# Patient Record
Sex: MALE | Employment: OTHER | URBAN - METROPOLITAN AREA
[De-identification: names, ages, dates, MRNs, and addresses within clinical notes are randomized per-mention and may not be internally consistent; named-entity substitution may affect disease eponyms.]

---

## 2022-08-03 ENCOUNTER — TELEMEDICINE (OUTPATIENT)
Dept: NEUROLOGY | Age: 67
End: 2022-08-03
Payer: COMMERCIAL

## 2022-08-03 DIAGNOSIS — G95.9 CERVICAL MYELOPATHY (HCC): Primary | ICD-10-CM

## 2022-08-03 DIAGNOSIS — G24.3 CERVICAL DYSTONIA: ICD-10-CM

## 2022-08-03 DIAGNOSIS — G95.9 CERVICAL MYELOPATHY (HCC): ICD-10-CM

## 2022-08-03 PROCEDURE — 99214 OFFICE O/P EST MOD 30 MIN: CPT | Performed by: PSYCHIATRY & NEUROLOGY

## 2022-08-03 RX ORDER — OXYCODONE HYDROCHLORIDE 30 MG/1
30 TABLET ORAL EVERY 6 HOURS PRN
Qty: 120 TABLET | Refills: 0 | Status: SHIPPED | OUTPATIENT
Start: 2022-10-03 | End: 2022-11-02

## 2022-08-03 RX ORDER — OXYCODONE HYDROCHLORIDE 30 MG/1
30 TABLET ORAL EVERY 6 HOURS PRN
Qty: 120 TABLET | Refills: 0 | Status: SHIPPED | OUTPATIENT
Start: 2022-09-03 | End: 2022-10-03

## 2022-08-03 RX ORDER — OXYCODONE HYDROCHLORIDE 30 MG/1
30 TABLET ORAL EVERY 6 HOURS PRN
Qty: 124 TABLET | Refills: 0 | Status: SHIPPED | OUTPATIENT
Start: 2022-09-03 | End: 2022-08-03 | Stop reason: SDUPTHER

## 2022-08-03 RX ORDER — OXYCODONE HYDROCHLORIDE 30 MG/1
30 TABLET ORAL EVERY 6 HOURS PRN
Qty: 124 TABLET | Refills: 0 | Status: SHIPPED | OUTPATIENT
Start: 2022-08-03 | End: 2022-09-02

## 2022-08-03 ASSESSMENT — ENCOUNTER SYMPTOMS
GASTROINTESTINAL NEGATIVE: 1
RESPIRATORY NEGATIVE: 1
ALLERGIC/IMMUNOLOGIC NEGATIVE: 1
EYES NEGATIVE: 1

## 2022-08-03 NOTE — TELEPHONE ENCOUNTER
Patient called stating that his prescription for the Oxycodone 30 mg was prescribed starting in September rather than starting in August like it was meant to, states that he is pretty much out of medication and is needing the prescription re sent in. Pls adv.      Last appt: VV 8/3  Next appt: n/a

## 2022-08-03 NOTE — PROGRESS NOTES
[address], [address], [address]  Phone: [phone] Fax [phone]  Dr. Dewayne Preciado      8/3/2022  Alia Jeans     Patient is referred by the following provider for consultation regarding as below:       I reviewed the available records and notes and have examined patient with the following findings:     Chief Complaint:  No chief complaint on file. HPI: This is a right handed 61 y.o.  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 I have him on Oxy IR 30 mg he takes 4 tablets a day gabapentin 600 mg twice a day baclofen 10 mg 1-2 3 times a day. And he was taking Klonopin but we are able to wean him off the Klonopin. He has been doing well overall he has no complaints he is already gone through all conservative care chronic pain management epidural steroid injections he has no interest in doing a spinal cord stimulator to his cervical spine nor would I allow him. 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 He has no other complaints at this time he is stable comfortable. His  aware program was looked into it was cleared. IMAGING REVIEW:  I REVIEWED PERTINENT  IMAGES AND REPORTS WITH THE PATIENT PERSONALLY, DIRECTLY AND FULLY. Past Medical History:  No past medical history on file. Past Surgical History:  No past surgical history on file.     Social History:  Social History     Socioeconomic History    Marital status:      Spouse name: Not on file    Number of children: Not on file    Years of education: Not on file    Highest education level: Not on file   Occupational History    Not on file   Tobacco Use    Smoking status: Former    Smokeless tobacco: Never   Substance and Sexual Activity    Alcohol use: Never    Drug use: Never    Sexual activity: Not on file   Other Topics Concern    Not on file   Social History Narrative    Not on file     Social Determinants of Health     Financial Resource Strain: Not on file   Food Insecurity: Not on file   Transportation Needs: Not on file   Physical Activity: Not on file   Stress: Not on file   Social Connections: Not on file   Intimate Partner Violence: Not on file   Housing Stability: Not on file       Family History:   No family history on file. Current Outpatient Medications on File Prior to Visit   Medication Sig Dispense Refill    baclofen (LIORESAL) 10 MG tablet Take 1-2 po tid      clonazePAM (KLONOPIN) 0.5 MG tablet Take 0.5 mg by mouth 2 times daily. diazePAM (VALIUM) 10 MG tablet Take 10 mg by mouth. diazePAM (VALIUM) 5 MG tablet Take 5 mg by mouth.      gabapentin (NEURONTIN) 600 MG tablet Take 600 mg by mouth 2 times daily. naloxone 4 MG/0.1ML LIQD nasal spray Use 1 spray intranasally, then discard. Repeat with new spray every 2 min as needed for opioid overdose symptoms, alternating nostrils. No current facility-administered medications on file prior to visit. Not on File    Review of Systems:  Review of Systems   Constitutional: Negative. HENT: Negative. Eyes: Negative. Respiratory: Negative. Cardiovascular: Negative. Gastrointestinal: Negative. Endocrine: Negative. Musculoskeletal:  Positive for neck pain. Allergic/Immunologic: Negative. No flowsheet data found. No flowsheet data found. There were no vitals filed for this visit. Physical Exam  Neurological:      Mental Status: He is alert and oriented to person, place, and time. Neurologic Exam     Mental Status   Oriented to person, place, and time. Attention: normal. Concentration: normal.   Level of consciousness: alert  Knowledge: good. Speech is clear and concise         Assessment   Assessment / Plan:    Diagnoses and all orders for this visit:    Cervical myelopathy ([facility] [address] the patient does suffer from post cervical myelopathy pain. From many years ago 20+ years ago. He has remained strong and doing well as long as he is on his medications Oxy IR 30 mg 4 times a day gabapentin 600 mg twice a day baclofen 10 mg 1-2 3 times a day Klonopin was discontinued he did not. He is doing well he has no complaints his  aware program was looked into it was cleared. -     oxyCODONE (OXY-IR) 30 MG immediate release tablet; Take 1 tablet by mouth every 6 hours as needed for Pain for up to 30 days. -     oxyCODONE (OXY-IR) 30 MG immediate release tablet; Take 1 tablet by mouth every 6 hours as needed for Pain for up to 30 days. -     oxyCODONE (OXY-IR) 30 MG immediate release tablet; Take 1 tablet by mouth every 6 hours as needed for Pain for up to 30 days. Cervical dystonia this was as residual from the trauma to his cervical spine from the myelopathy and the surgical procedures. Unfortunately Botox made it much worse. The Diagnosis and differential diagnostic considerations, and Rx Tx were reviewed with the patient at length. No orders of the defined types were placed in this encounter. I have spent greater than 50% of visit discussing and counseling of patient  for treatment and diagnostic plan review. Total time30 min     . Notes: Patient is to continue all medications as directed by prescribing physicians. Continuations on today's visit are made based on the patient's report of current medications.              Dr. Nicol Campos  Consultation Neurology, Neurodiagnostics and Neurotherapeutics  Neuroelectrophysiology, EEG, EMG  [facility] [place]  [address] [place] [address], [address] [facility]  Phone:  [phone]  Fax:   [phone]

## 2022-09-06 RX ORDER — BACLOFEN 10 MG/1
TABLET ORAL
Qty: 180 TABLET | Refills: 5 | Status: SHIPPED | OUTPATIENT
Start: 2022-09-06

## 2022-11-28 ENCOUNTER — TELEPHONE (OUTPATIENT)
Dept: NEUROLOGY | Age: 67
End: 2022-11-28

## 2022-11-28 DIAGNOSIS — M54.12 CERVICAL RADICULITIS: Primary | ICD-10-CM

## 2022-11-28 DIAGNOSIS — M54.12 RADICULOPATHY, CERVICAL REGION: Primary | ICD-10-CM

## 2022-11-28 RX ORDER — OXYCODONE HYDROCHLORIDE 30 MG/1
30 TABLET ORAL EVERY 6 HOURS PRN
Qty: 120 TABLET | Refills: 0 | Status: CANCELLED | OUTPATIENT
Start: 2022-11-28 | End: 2022-12-28

## 2022-11-28 RX ORDER — OXYCODONE HYDROCHLORIDE 30 MG/1
30 TABLET ORAL EVERY 6 HOURS PRN
Qty: 120 TABLET | Refills: 0 | Status: SHIPPED | OUTPATIENT
Start: 2022-11-28 | End: 2022-12-28

## 2022-11-28 NOTE — TELEPHONE ENCOUNTER
Patient called requesting refill on oxycodone 30 mg sent to:    [facility], [address] [place] [address]   Phone:  [phone]  Fax:  [phone]        Last seen: 08/03/22  Next appt: 12/06/22

## 2022-12-06 ENCOUNTER — TELEMEDICINE (OUTPATIENT)
Dept: NEUROLOGY | Age: 67
End: 2022-12-06
Payer: COMMERCIAL

## 2022-12-06 DIAGNOSIS — M54.12 CERVICAL RADICULITIS: ICD-10-CM

## 2022-12-06 DIAGNOSIS — G24.3 CERVICAL DYSTONIA: Primary | ICD-10-CM

## 2022-12-06 PROCEDURE — G8427 DOCREV CUR MEDS BY ELIG CLIN: HCPCS | Performed by: PSYCHIATRY & NEUROLOGY

## 2022-12-06 PROCEDURE — 99213 OFFICE O/P EST LOW 20 MIN: CPT | Performed by: PSYCHIATRY & NEUROLOGY

## 2022-12-06 PROCEDURE — 1123F ACP DISCUSS/DSCN MKR DOCD: CPT | Performed by: PSYCHIATRY & NEUROLOGY

## 2022-12-06 PROCEDURE — G8484 FLU IMMUNIZE NO ADMIN: HCPCS | Performed by: PSYCHIATRY & NEUROLOGY

## 2022-12-06 PROCEDURE — 4004F PT TOBACCO SCREEN RCVD TLK: CPT | Performed by: PSYCHIATRY & NEUROLOGY

## 2022-12-06 PROCEDURE — G8421 BMI NOT CALCULATED: HCPCS | Performed by: PSYCHIATRY & NEUROLOGY

## 2022-12-06 PROCEDURE — 3017F COLORECTAL CA SCREEN DOC REV: CPT | Performed by: PSYCHIATRY & NEUROLOGY

## 2022-12-06 RX ORDER — GABAPENTIN 600 MG/1
600 TABLET ORAL 2 TIMES DAILY
Qty: 540 TABLET | Refills: 3 | Status: SHIPPED | OUTPATIENT
Start: 2022-12-06 | End: 2023-03-06

## 2022-12-06 RX ORDER — OXYCODONE HYDROCHLORIDE 30 MG/1
30 TABLET ORAL EVERY 6 HOURS PRN
Qty: 120 TABLET | Refills: 0 | Status: SHIPPED | OUTPATIENT
Start: 2023-01-30 | End: 2023-03-01

## 2022-12-06 RX ORDER — OXYCODONE HYDROCHLORIDE 30 MG/1
30 TABLET ORAL EVERY 6 HOURS PRN
Qty: 120 TABLET | Refills: 0 | Status: SHIPPED | OUTPATIENT
Start: 2022-12-30 | End: 2023-01-29

## 2022-12-06 ASSESSMENT — ENCOUNTER SYMPTOMS
ALLERGIC/IMMUNOLOGIC NEGATIVE: 1
RESPIRATORY NEGATIVE: 1
GASTROINTESTINAL NEGATIVE: 1
EYES NEGATIVE: 1

## 2022-12-06 NOTE — PROGRESS NOTES
[address], [address]  [place], [address]  Phone: [phone] Fax [phone]  Dr. Carmella Hopper was in the office While conducting this encounter. She and/ or her healthcare decision maker is aware that this patient-initialed Telehealth encounter is a billable service with coverage as determined by her insurance carrier. She is aware that she may receive a bill and has provided verbal consent to proceed: Yes    The Virtual visit was conducted telephone encounter only. -  I affirm this is a Patient Initiated Episode with an Established Patient who has not had a related appointment within my department in the past 7 days or scheduled within the next 24 hours. Note: this encounter is not billable if this call serves to triage the patient into an appointment for the relevant concern. Total Time:  minutes: 20-29 minutes          12/6/2022  Delora Mail     Patient is referred by the following provider for consultation regarding as below:       I reviewed the available records and notes and have examined patient with the following findings:     Chief Complaint:  No chief complaint on file. HPI: This is a right handed 74 y.o.  male who is very pleasant very appropriate gentleman who I have been taking care for many years 20+ years. 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 He actually did go through Botox and unfortunately Botox increased his pain level from severe to enormously severe.   Over the years of injections and physical therapy trigger point injections manipulation he is resolved with Oxy IR 30 mg 4-day gabapentin 600 mg twice a day and baclofen 10 mg 2 tablets 3 times a day. He weaned himself off therapy Klonopin due to the stigma of taking it. And is still doing well. He is of course gets more more spasms. But he is stable comfortable his  aware program was looked into and was cleared he filled his medicines on 12 1 a 22 syringes and tomorrow's prescriptions in. That is for his pain medicines. And refills gabapentin. He is pleasant cooperative and doing well. IMAGING REVIEW:  I REVIEWED PERTINENT  IMAGES AND REPORTS WITH THE PATIENT PERSONALLY, DIRECTLY AND FULLY. Past Medical History:  No past medical history on file. Past Surgical History:  No past surgical history on file. Social History:  Social History     Socioeconomic History    Marital status:      Spouse name: Not on file    Number of children: Not on file    Years of education: Not on file    Highest education level: Not on file   Occupational History    Not on file   Tobacco Use    Smoking status: Former    Smokeless tobacco: Never   Substance and Sexual Activity    Alcohol use: Never    Drug use: Never    Sexual activity: Not on file   Other Topics Concern    Not on file   Social History Narrative    Not on file     Social Determinants of Health     Financial Resource Strain: Not on file   Food Insecurity: Not on file   Transportation Needs: Not on file   Physical Activity: Not on file   Stress: Not on file   Social Connections: Not on file   Intimate Partner Violence: Not on file   Housing Stability: Not on file       Family History:   No family history on file. Current Outpatient Medications on File Prior to Visit   Medication Sig Dispense Refill    oxyCODONE (OXY-IR) 30 MG immediate release tablet Take 1 tablet by mouth every 6 hours as needed for Pain for up to 30 days. 120 tablet 0    baclofen (LIORESAL) 10 MG tablet T1KE 1-2 TABLETS BY MOUTH THREE TIMES DAILY 180 tablet 5    clonazePAM (KLONOPIN) 0.5 MG tablet Take 0.5 mg by mouth 2 times daily.       diazePAM (VALIUM) 10 MG tablet Take 10 mg by mouth. diazePAM (VALIUM) 5 MG tablet Take 5 mg by mouth.      gabapentin (NEURONTIN) 600 MG tablet Take 600 mg by mouth 2 times daily. naloxone 4 MG/0.1ML LIQD nasal spray Use 1 spray intranasally, then discard. Repeat with new spray every 2 min as needed for opioid overdose symptoms, alternating nostrils. No current facility-administered medications on file prior to visit. Not on File    Review of Systems:  Review of Systems   Constitutional: Negative. HENT: Negative. Eyes: Negative. Respiratory: Negative. Cardiovascular: Negative. Gastrointestinal: Negative. Endocrine: Negative. Genitourinary: Negative. Musculoskeletal:  Positive for neck pain. Allergic/Immunologic: Negative. Neurological: Negative. Hematological: Negative. Psychiatric/Behavioral: Negative. No flowsheet data found. No flowsheet data found. There were no vitals filed for this visit. Physical Exam  Neurological:      Mental Status: He is alert and oriented to person, place, and time. Neurologic Exam     Mental Status   Oriented to person, place, and time. Attention: normal. Concentration: normal.   Level of consciousness: alert  Knowledge: good. His speech is clear and concise. Assessment   Assessment / Plan:    Diagnoses and all orders for this visit:    Cervical dystonia    Cervical radiculitis        The Diagnosis and differential diagnostic considerations, and Rx Tx were reviewed with the patient at length. No orders of the defined types were placed in this encounter. I have spent greater than 50% of visit discussing and counseling of patient  for treatment and diagnostic plan review. Total time30 min     . Notes: Patient is to continue all medications as directed by prescribing physicians. Continuations on today's visit are made based on the patient's report of current medications.              Dr. Tiffanie Mcadams [facility]  Consultation Neurology, Neurodiagnostics and Neurotherapeutics  Neuroelectrophysiology, EEG, EMG  [facility]  [facility] [place] [place] [address], [address] [facility]  Phone:  [phone]  Fax:   [phone]

## 2022-12-13 ENCOUNTER — TELEPHONE (OUTPATIENT)
Dept: NEUROLOGY | Age: 67
End: 2022-12-13

## 2022-12-13 NOTE — TELEPHONE ENCOUNTER
----- Message from Brian Shafer DO sent at 12/6/2022  6:52 AM EST -----  Regarding: fu  Fu virtual 7AM in 3-4 mths

## 2023-02-24 ENCOUNTER — TELEMEDICINE (OUTPATIENT)
Dept: NEUROLOGY | Age: 68
End: 2023-02-24
Payer: COMMERCIAL

## 2023-02-24 DIAGNOSIS — M54.12 CERVICAL RADICULITIS: ICD-10-CM

## 2023-02-24 DIAGNOSIS — G24.3 CERVICAL DYSTONIA: ICD-10-CM

## 2023-02-24 PROCEDURE — 1123F ACP DISCUSS/DSCN MKR DOCD: CPT | Performed by: PSYCHIATRY & NEUROLOGY

## 2023-02-24 PROCEDURE — 99213 OFFICE O/P EST LOW 20 MIN: CPT | Performed by: PSYCHIATRY & NEUROLOGY

## 2023-02-24 PROCEDURE — G8484 FLU IMMUNIZE NO ADMIN: HCPCS | Performed by: PSYCHIATRY & NEUROLOGY

## 2023-02-24 PROCEDURE — 4004F PT TOBACCO SCREEN RCVD TLK: CPT | Performed by: PSYCHIATRY & NEUROLOGY

## 2023-02-24 PROCEDURE — 3017F COLORECTAL CA SCREEN DOC REV: CPT | Performed by: PSYCHIATRY & NEUROLOGY

## 2023-02-24 PROCEDURE — G8421 BMI NOT CALCULATED: HCPCS | Performed by: PSYCHIATRY & NEUROLOGY

## 2023-02-24 PROCEDURE — G8427 DOCREV CUR MEDS BY ELIG CLIN: HCPCS | Performed by: PSYCHIATRY & NEUROLOGY

## 2023-02-24 RX ORDER — OXYCODONE HYDROCHLORIDE 30 MG/1
30 TABLET ORAL EVERY 8 HOURS PRN
Qty: 90 TABLET | Refills: 0 | Status: SHIPPED | OUTPATIENT
Start: 2023-02-28 | End: 2023-03-30

## 2023-02-24 RX ORDER — OXYCODONE HYDROCHLORIDE 30 MG/1
30 TABLET ORAL EVERY 8 HOURS PRN
Qty: 90 TABLET | Refills: 0 | Status: SHIPPED | OUTPATIENT
Start: 2023-04-29 | End: 2023-05-29

## 2023-02-24 RX ORDER — OXYCODONE HYDROCHLORIDE 30 MG/1
30 TABLET ORAL EVERY 8 HOURS PRN
Qty: 90 TABLET | Refills: 0 | Status: SHIPPED | OUTPATIENT
Start: 2023-03-30 | End: 2023-04-29

## 2023-02-24 RX ORDER — BACLOFEN 10 MG/1
TABLET ORAL
Qty: 180 TABLET | Refills: 5 | Status: SHIPPED | OUTPATIENT
Start: 2023-02-24

## 2023-02-24 RX ORDER — GABAPENTIN 600 MG/1
600 TABLET ORAL 2 TIMES DAILY
Qty: 540 TABLET | Refills: 3 | Status: SHIPPED | OUTPATIENT
Start: 2023-02-24 | End: 2023-05-25

## 2023-02-24 ASSESSMENT — ENCOUNTER SYMPTOMS
EYES NEGATIVE: 1
RESPIRATORY NEGATIVE: 1
GASTROINTESTINAL NEGATIVE: 1
ALLERGIC/IMMUNOLOGIC NEGATIVE: 1

## 2023-02-24 NOTE — PROGRESS NOTES
[address], [address] [place] [address], [address] [place] [address]  Phone: [phone] Fax [phone]  Dr. Veronica Frank      I was in the office While conducting this encounter. She and/ or her healthcare decision maker is aware that this patient-initialed Telehealth encounter is a billable service with coverage as determined by her insurance carrier. She is aware that she may receive a bill and has provided verbal consent to proceed: Yes    The Virtual visit was conducted telephone encounter only. -  I affirm this is a Patient Initiated Episode with an Established Patient who has not had a related appointment within my department in the past 7 days or scheduled within the next 24 hours. Note: this encounter is not billable if this call serves to triage the patient into an appointment for the relevant concern. Total Time:  minutes: 21-30 minutes      2/24/2023  Duy Palmer     Patient is referred by the following provider for consultation regarding as below:       I reviewed the available records and notes and have examined patient with the following findings:     Chief Complaint:  No chief complaint on file. HPI: This is a right handed 74 y.o.  male who is very pleasant very appropriate patient who unfortunately does have history that I been taking care of him for 20+ years. He unfortunately had status post severe cervical spinal cord stenosis with myelopathy that required surgical intervention he. 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 And never did help. We have got he was on very high dose of medications of course this was in the early 2000's when that was mandatory. We have got him down to Oxy IR 30 mg tablets 3 times a day (he was on 4 times a day).   We have him on gabapentin 600 mg twice a day baclofen 10 mg 2 tablets 3 times a day and we have weaned him off of his Klonopin. He is doing well he is stable he is comfortable at this time. As long as he gets his medications he is stable. He has no complaints. We have also tried a multitude of conservative procedures which include physical therapy injections epidural steroid injections trigger point injections chronic pain management. IMAGING REVIEW:  I REVIEWED PERTINENT  IMAGES AND REPORTS WITH THE PATIENT PERSONALLY, DIRECTLY AND FULLY. Past Medical History:  No past medical history on file. Past Surgical History:  No past surgical history on file. Social History:  Social History     Socioeconomic History    Marital status:      Spouse name: Not on file    Number of children: Not on file    Years of education: Not on file    Highest education level: Not on file   Occupational History    Not on file   Tobacco Use    Smoking status: Former    Smokeless tobacco: Never   Substance and Sexual Activity    Alcohol use: Never    Drug use: Never    Sexual activity: Not on file   Other Topics Concern    Not on file   Social History Narrative    Not on file     Social Determinants of Health     Financial Resource Strain: Not on file   Food Insecurity: Not on file   Transportation Needs: Not on file   Physical Activity: Not on file   Stress: Not on file   Social Connections: Not on file   Intimate Partner Violence: Not on file   Housing Stability: Not on file       Family History:   No family history on file. Current Outpatient Medications on File Prior to Visit   Medication Sig Dispense Refill    clonazePAM (KLONOPIN) 0.5 MG tablet Take 0.5 mg by mouth 2 times daily. diazePAM (VALIUM) 10 MG tablet Take 10 mg by mouth. diazePAM (VALIUM) 5 MG tablet Take 5 mg by mouth.      naloxone 4 MG/0.1ML LIQD nasal spray Use 1 spray intranasally, then discard. Repeat with new spray every 2 min as needed for opioid overdose symptoms, alternating nostrils.        No current facility-administered medications on file prior to visit. Not on File    Review of Systems:  Review of Systems   Constitutional: Negative. HENT: Negative. Eyes: Negative. Respiratory: Negative. Cardiovascular: Negative. Gastrointestinal: Negative. Endocrine: Negative. Genitourinary: Negative. Musculoskeletal:  Positive for neck pain. Allergic/Immunologic: Negative. Hematological: Negative. Psychiatric/Behavioral: Negative. No flowsheet data found. No flowsheet data found. There were no vitals filed for this visit. Physical Exam  Neurological:      Mental Status: He is alert and oriented to person, place, and time. Neurologic Exam     Mental Status   Oriented to person, place, and time. Attention: normal. Concentration: normal.   Level of consciousness: alert  Knowledge: good. His speech is clear and concise         Assessment   Assessment / Plan:    Diagnoses and all orders for this visit:    Cervical radiculitis we now have him down to Yoselin 30 tid. He was taking 4 a day. He is also on gabapentin 600 mg twice a day baclofen 10 mg 2 tablets 3 times a day and we have also got him off of his Klonopin. His  aware program was looked into and is cleared. -     oxyCODONE (ROXICODONE) 30 MG immediate release tablet; Take 1 tablet by mouth every 8 hours as needed for Pain for up to 30 days. Max Daily Amount: 90 mg  -     oxyCODONE (ROXICODONE) 30 MG immediate release tablet; Take 1 tablet by mouth every 8 hours as needed for Pain for up to 30 days. Max Daily Amount: 90 mg  -     oxyCODONE (ROXICODONE) 30 MG immediate release tablet; Take 1 tablet by mouth every 8 hours as needed for Pain for up to 30 days. Max Daily Amount: 90 mg    Cervical dystonia  -     oxyCODONE (ROXICODONE) 30 MG immediate release tablet; Take 1 tablet by mouth every 8 hours as needed for Pain for up to 30 days.  Max Daily Amount: 90 mg    Other orders  -     baclofen (LIORESAL) 10 MG tablet; T1KE 1-2 TABLETS BY MOUTH THREE TIMES DAILY  -     gabapentin (NEURONTIN) 600 MG tablet; Take 1 tablet by mouth 2 times daily for 90 days. The Diagnosis and differential diagnostic considerations, and Rx Tx were reviewed with the patient at length. No orders of the defined types were placed in this encounter. I have spent greater than 50% of visit discussing and counseling of patient  for treatment and diagnostic plan review. Total time20     . Notes: Patient is to continue all medications as directed by prescribing physicians. Continuations on today's visit are made based on the patient's report of current medications.              Dr. Veronica Frank  Consultation Neurology, Neurodiagnostics and Neurotherapeutics  Neuroelectrophysiology, EEG, EMG  [facility]  [address] [place] [address], [address] [facility]  Phone:  [phone]  Fax:   [phone]

## 2023-03-17 ENCOUNTER — TELEPHONE (OUTPATIENT)
Dept: NEUROLOGY | Age: 68
End: 2023-03-17

## 2023-03-17 NOTE — TELEPHONE ENCOUNTER
----- Message from Deandra Solis DO sent at 2/24/2023  6:52 AM EST -----  Regarding: fu  Will need a follow-up appointment in about 6 months please

## 2023-05-22 ENCOUNTER — TELEPHONE (OUTPATIENT)
Dept: NEUROLOGY | Age: 68
End: 2023-05-22

## 2023-05-22 DIAGNOSIS — M54.12 CERVICAL RADICULITIS: ICD-10-CM

## 2023-05-22 RX ORDER — OXYCODONE HYDROCHLORIDE 30 MG/1
30 TABLET ORAL EVERY 8 HOURS PRN
Qty: 90 TABLET | Refills: 0 | Status: SHIPPED | OUTPATIENT
Start: 2023-05-22 | End: 2023-06-21

## 2023-06-20 ENCOUNTER — TELEPHONE (OUTPATIENT)
Dept: NEUROLOGY | Age: 68
End: 2023-06-20

## 2023-06-21 DIAGNOSIS — M54.12 CERVICAL RADICULITIS: ICD-10-CM

## 2023-06-21 RX ORDER — OXYCODONE HYDROCHLORIDE 30 MG/1
30 TABLET ORAL EVERY 8 HOURS PRN
Qty: 90 TABLET | Refills: 0 | Status: SHIPPED | OUTPATIENT
Start: 2023-06-21 | End: 2023-07-21

## 2023-07-24 ENCOUNTER — TELEPHONE (OUTPATIENT)
Dept: NEUROLOGY | Age: 68
End: 2023-07-24

## 2023-07-24 DIAGNOSIS — M54.12 CERVICAL RADICULITIS: Primary | ICD-10-CM

## 2023-07-24 RX ORDER — OXYCODONE HYDROCHLORIDE 30 MG/1
30 TABLET ORAL EVERY 8 HOURS PRN
Qty: 90 TABLET | Refills: 0 | Status: SHIPPED | OUTPATIENT
Start: 2023-07-24 | End: 2023-08-23

## 2023-07-24 RX ORDER — BACLOFEN 10 MG/1
TABLET ORAL
Qty: 180 TABLET | Refills: 5 | Status: SHIPPED | OUTPATIENT
Start: 2023-07-24

## 2023-07-24 NOTE — TELEPHONE ENCOUNTER
Patient called back stating he also needs a refill of the Baclofen 10 mg as well as the Oxycodone 30 mg sent to [facility] pharmacy. Patient is scheduled for a follow up August 16th.

## 2023-07-24 NOTE — TELEPHONE ENCOUNTER
Patient needs a refill of Oxycodone 30 MG sent to [facility].  Patients next follow up is August 16 th.

## 2023-08-16 ENCOUNTER — TELEMEDICINE (OUTPATIENT)
Dept: NEUROLOGY | Age: 68
End: 2023-08-16
Payer: COMMERCIAL

## 2023-08-16 DIAGNOSIS — M54.12 CERVICAL RADICULITIS: ICD-10-CM

## 2023-08-16 DIAGNOSIS — G24.3 CERVICAL DYSTONIA: Primary | ICD-10-CM

## 2023-08-16 PROCEDURE — 1123F ACP DISCUSS/DSCN MKR DOCD: CPT | Performed by: PSYCHIATRY & NEUROLOGY

## 2023-08-16 PROCEDURE — G8427 DOCREV CUR MEDS BY ELIG CLIN: HCPCS | Performed by: PSYCHIATRY & NEUROLOGY

## 2023-08-16 PROCEDURE — G8421 BMI NOT CALCULATED: HCPCS | Performed by: PSYCHIATRY & NEUROLOGY

## 2023-08-16 PROCEDURE — 99214 OFFICE O/P EST MOD 30 MIN: CPT | Performed by: PSYCHIATRY & NEUROLOGY

## 2023-08-16 PROCEDURE — 3017F COLORECTAL CA SCREEN DOC REV: CPT | Performed by: PSYCHIATRY & NEUROLOGY

## 2023-08-16 PROCEDURE — 4004F PT TOBACCO SCREEN RCVD TLK: CPT | Performed by: PSYCHIATRY & NEUROLOGY

## 2023-08-16 RX ORDER — OXYCODONE HYDROCHLORIDE 30 MG/1
30 TABLET ORAL EVERY 8 HOURS PRN
Qty: 90 TABLET | Refills: 0 | Status: SHIPPED | OUTPATIENT
Start: 2023-11-25 | End: 2023-11-28

## 2023-08-16 RX ORDER — OXYCODONE HYDROCHLORIDE 30 MG/1
30 TABLET ORAL EVERY 8 HOURS PRN
Qty: 90 TABLET | Refills: 0 | Status: SHIPPED | OUTPATIENT
Start: 2023-08-26 | End: 2023-09-25

## 2023-08-16 RX ORDER — BACLOFEN 10 MG/1
TABLET ORAL
Qty: 180 TABLET | Refills: 5 | Status: SHIPPED | OUTPATIENT
Start: 2023-08-16

## 2023-08-16 RX ORDER — OXYCODONE HYDROCHLORIDE 30 MG/1
30 TABLET ORAL EVERY 8 HOURS PRN
Qty: 90 TABLET | Refills: 0 | Status: SHIPPED | OUTPATIENT
Start: 2023-09-25 | End: 2023-10-25

## 2023-08-16 ASSESSMENT — ENCOUNTER SYMPTOMS
ALLERGIC/IMMUNOLOGIC NEGATIVE: 1
EYES NEGATIVE: 1
GASTROINTESTINAL NEGATIVE: 1
RESPIRATORY NEGATIVE: 1

## 2023-08-16 NOTE — PROGRESS NOTES
is currently on oxy IR 30s 3 times a day and gabapentin 600 mg twice a day baclofen 10 mg she takes 2 tablets 3 times a day. We actually have weaned him down dramatically he is completely off his Klonopin and Valium. He is doing dramatically well he is still doing well. We have tried Botox for cervical dystonia with very poor response in the past.  His  aware program will be looked into and is cleared. -     oxyCODONE (OXY-IR) 30 MG immediate release tablet; Take 1 tablet by mouth every 8 hours as needed for Pain for up to 30 days. Max Daily Amount: 90 mg  -     oxyCODONE (OXY-IR) 30 MG immediate release tablet; Take 1 tablet by mouth every 8 hours as needed for Pain for up to 30 days. Max Daily Amount: 90 mg  -     oxyCODONE (OXY-IR) 30 MG immediate release tablet; Take 1 tablet by mouth every 8 hours as needed for Pain for up to 3 days. Max Daily Amount: 90 mg    Other orders  -     baclofen (LIORESAL) 10 MG tablet; T1KE 1-2 TABLETS BY MOUTH THREE TIMES DAILY        The Diagnosis and differential diagnostic considerations, and Rx Tx were reviewed with the patient at length. No orders of the defined types were placed in this encounter. I have spent greater than 50% of visit discussing and counseling of patient  for treatment and diagnostic plan review. Total time30     . Notes: Patient is to continue all medications as directed by prescribing physicians. Continuations on today's visit are made based on the patient's report of current medications.              Dr. Chad Kan  Consultation Neurology, Neurodiagnostics and Neurotherapeutics  Neuroelectrophysiology, EEG, EMG  [facility]  [address] [facility], [address] [place] [address]  Phone: [phone] Fax [phone]

## 2023-10-11 ENCOUNTER — TELEPHONE (OUTPATIENT)
Dept: NEUROLOGY | Age: 68
End: 2023-10-11

## 2023-10-11 NOTE — TELEPHONE ENCOUNTER
----- Message from Daya Almaraz DO sent at 8/16/2023  7:04 AM EDT -----  He will need a follow-up appointment in 6 months please virtual

## 2023-10-16 ENCOUNTER — TELEPHONE (OUTPATIENT)
Dept: NEUROLOGY | Age: 68
End: 2023-10-16

## 2023-10-16 DIAGNOSIS — M54.12 CERVICAL RADICULITIS: ICD-10-CM

## 2023-10-16 RX ORDER — OXYCODONE HYDROCHLORIDE 30 MG/1
30 TABLET ORAL EVERY 8 HOURS PRN
Qty: 90 TABLET | Refills: 0 | Status: SHIPPED | OUTPATIENT
Start: 2023-10-16 | End: 2023-11-15

## 2023-10-16 NOTE — TELEPHONE ENCOUNTER
Patient states he needs a prescription for oxyCODONE (OXY-IR) 30 MG immediate release tablet for the month of October. Pt states that he has a prescription for November but not October. Please send prescription to [address] [place] [address]. Thank you so much!

## 2023-11-27 ENCOUNTER — TELEPHONE (OUTPATIENT)
Dept: NEUROLOGY | Age: 68
End: 2023-11-27

## 2023-11-27 DIAGNOSIS — M54.12 CERVICAL RADICULITIS: ICD-10-CM

## 2023-11-27 RX ORDER — OXYCODONE HYDROCHLORIDE 30 MG/1
30 TABLET ORAL EVERY 8 HOURS PRN
Qty: 90 TABLET | Refills: 0 | Status: SHIPPED | OUTPATIENT
Start: 2023-11-27 | End: 2023-12-27

## 2023-11-27 NOTE — TELEPHONE ENCOUNTER
Pt called stating he needs a refill of his Oxycodone 30 mg immediate release tablet. He stated he called the office last week to get a refill and whoever he spoke to stated it had been filled, but the pharmacy is saying it was not filled. Send to [facility] and patient would like a call back with an update @ [phone].

## 2024-01-22 ENCOUNTER — TELEPHONE (OUTPATIENT)
Dept: NEUROLOGY | Age: 69
End: 2024-01-22

## 2024-01-22 DIAGNOSIS — M54.12 CERVICAL RADICULITIS: Primary | ICD-10-CM

## 2024-01-22 RX ORDER — OXYCODONE HYDROCHLORIDE 30 MG/1
30 TABLET ORAL EVERY 8 HOURS PRN
Qty: 90 TABLET | Refills: 0 | Status: SHIPPED | OUTPATIENT
Start: 2024-01-22 | End: 2024-01-25

## 2024-01-22 NOTE — TELEPHONE ENCOUNTER
Patient called requesting refills of [facility], [address] - P [phone] - F [phone].

## 2024-01-31 RX ORDER — BACLOFEN 10 MG/1
TABLET ORAL
Qty: 180 TABLET | Refills: 5 | Status: SHIPPED | OUTPATIENT
Start: 2024-01-31

## 2024-02-20 ENCOUNTER — TELEPHONE (OUTPATIENT)
Dept: NEUROLOGY | Age: 69
End: 2024-02-20

## 2024-02-20 DIAGNOSIS — M54.12 CERVICAL RADICULITIS: Primary | ICD-10-CM

## 2024-02-20 RX ORDER — OXYCODONE HYDROCHLORIDE 30 MG/1
30 TABLET ORAL EVERY 8 HOURS PRN
Qty: 90 TABLET | Refills: 0 | Status: SHIPPED | OUTPATIENT
Start: 2024-02-20 | End: 2024-03-21

## 2024-03-18 DIAGNOSIS — M54.12 CERVICAL RADICULITIS: ICD-10-CM

## 2024-03-18 RX ORDER — OXYCODONE HYDROCHLORIDE 30 MG/1
30 TABLET ORAL EVERY 8 HOURS PRN
Qty: 90 TABLET | Refills: 0 | Status: SHIPPED | OUTPATIENT
Start: 2024-03-18 | End: 2024-04-17

## 2024-03-18 NOTE — TELEPHONE ENCOUNTER
Pt requests refill of oxyCODONE (OXY-IR) 30 MG immediate release tablet to be sent to   [facility] [place] [place] - [address] - P [phone] - F [phone]

## 2024-04-03 ENCOUNTER — TELEMEDICINE (OUTPATIENT)
Dept: NEUROLOGY | Age: 69
End: 2024-04-03
Payer: COMMERCIAL

## 2024-04-03 DIAGNOSIS — G24.3 CERVICAL DYSTONIA: ICD-10-CM

## 2024-04-03 DIAGNOSIS — M54.12 CERVICAL RADICULITIS: Primary | ICD-10-CM

## 2024-04-03 PROCEDURE — 4004F PT TOBACCO SCREEN RCVD TLK: CPT | Performed by: PSYCHIATRY & NEUROLOGY

## 2024-04-03 PROCEDURE — 99213 OFFICE O/P EST LOW 20 MIN: CPT | Performed by: PSYCHIATRY & NEUROLOGY

## 2024-04-03 PROCEDURE — G8421 BMI NOT CALCULATED: HCPCS | Performed by: PSYCHIATRY & NEUROLOGY

## 2024-04-03 PROCEDURE — 3017F COLORECTAL CA SCREEN DOC REV: CPT | Performed by: PSYCHIATRY & NEUROLOGY

## 2024-04-03 PROCEDURE — 1123F ACP DISCUSS/DSCN MKR DOCD: CPT | Performed by: PSYCHIATRY & NEUROLOGY

## 2024-04-03 PROCEDURE — G8427 DOCREV CUR MEDS BY ELIG CLIN: HCPCS | Performed by: PSYCHIATRY & NEUROLOGY

## 2024-04-03 RX ORDER — OXYCODONE HYDROCHLORIDE 30 MG/1
30 TABLET ORAL EVERY 8 HOURS PRN
Qty: 90 TABLET | Refills: 0 | Status: SHIPPED | OUTPATIENT
Start: 2024-05-03 | End: 2024-06-02

## 2024-04-03 RX ORDER — OXYCODONE HYDROCHLORIDE 30 MG/1
30 TABLET ORAL EVERY 8 HOURS PRN
Qty: 90 TABLET | Refills: 0 | Status: SHIPPED | OUTPATIENT
Start: 2024-04-03 | End: 2024-05-03

## 2024-04-03 RX ORDER — OXYCODONE HYDROCHLORIDE 30 MG/1
30 TABLET ORAL EVERY 8 HOURS PRN
Qty: 90 TABLET | Refills: 0 | Status: SHIPPED | OUTPATIENT
Start: 2024-06-02 | End: 2024-07-02

## 2024-04-03 ASSESSMENT — ENCOUNTER SYMPTOMS
GASTROINTESTINAL NEGATIVE: 1
RESPIRATORY NEGATIVE: 1

## 2024-04-03 NOTE — PROGRESS NOTES
[facility] NEUROLOGY  [address], [address]  [place] [address]  Phone: [phone] Fax [phone]  Dr. Moreno Villalobos      I was in the office While conducting this encounter.    She and/ or her healthcare decision maker is aware that this patient-initialed Telehealth encounter is a billable service with coverage as determined by her insurance carrier.  She is aware that she may receive a bill and has provided verbal consent to proceed: Yes    The Virtual visit was conducted via [employer]. Pursuant to the emergency declaration under the Falcon Act and the National Emergencies Act, 1135 waiver authority and the Coronavirus Preparedness and Response Supplemental Appropriations Act, this Virtual  Visit was conducted to reduce the patient's risk of exposure to COVID-19 and provide continuity of care for an established patient. Services were provided through a video synchronous discussion virtually to substitute for in-person clinic visit.  Due to this being a TeleHealth evaluation, many elements of the physical examination are unable to be assessed.     Total Time:  minutes: 21-30 minutes      4/3/2024  Con Stewart     Patient is referred by the following provider for consultation regarding as below:       I reviewed the available records and notes and have examined patient with the following findings:     Chief Complaint:  No chief complaint on file.         HPI: This is a right handed 68 y.o.  male who is very pleasant very appropriate gentleman who had a very difficult time working his computer so I only saw him for a brief moment.  He is status post cervical surgery from over 20 years ago with secondary myelopathy and cervical dystonia.  He has only [place] almost no range of motion of the cervical spine.  He cannot flex or extend or rotate sidebending.  Status post surgery we did try Botox for the cervical dystonia that said and it unfortunately made his neck pain and headaches much worse.

## 2024-05-14 RX ORDER — GABAPENTIN 600 MG/1
600 TABLET ORAL 2 TIMES DAILY
Qty: 540 TABLET | Refills: 3 | Status: SHIPPED | OUTPATIENT
Start: 2024-05-14 | End: 2024-08-12

## 2024-07-08 RX ORDER — BACLOFEN 10 MG/1
TABLET ORAL
Qty: 180 TABLET | Refills: 5 | OUTPATIENT
Start: 2024-07-08

## 2024-07-15 RX ORDER — BACLOFEN 10 MG/1
TABLET ORAL
Qty: 180 TABLET | Refills: 5 | OUTPATIENT
Start: 2024-07-15

## 2024-07-16 ENCOUNTER — TELEPHONE (OUTPATIENT)
Dept: NEUROLOGY | Age: 69
End: 2024-07-16

## 2024-07-16 RX ORDER — BACLOFEN 10 MG/1
TABLET ORAL
Qty: 180 TABLET | Refills: 5 | Status: SHIPPED | OUTPATIENT
Start: 2024-07-16

## 2024-07-16 RX ORDER — GABAPENTIN 600 MG/1
600 TABLET ORAL 2 TIMES DAILY
Qty: 180 TABLET | Refills: 3 | Status: SHIPPED | OUTPATIENT
Start: 2024-07-16 | End: 2024-10-14

## 2024-07-19 DIAGNOSIS — S16.1XXD STRAIN OF NECK MUSCLE, SUBSEQUENT ENCOUNTER: Primary | ICD-10-CM

## 2024-07-19 DIAGNOSIS — S16.1XXD STRAIN OF NECK MUSCLE, SUBSEQUENT ENCOUNTER: ICD-10-CM

## 2024-07-19 RX ORDER — OXYCODONE HYDROCHLORIDE 30 MG/1
30 TABLET ORAL EVERY 8 HOURS PRN
Qty: 90 TABLET | Refills: 0 | Status: SHIPPED | OUTPATIENT
Start: 2024-07-19 | End: 2024-07-19 | Stop reason: SDUPTHER

## 2024-07-20 RX ORDER — OXYCODONE HYDROCHLORIDE 30 MG/1
30 TABLET ORAL EVERY 8 HOURS PRN
Qty: 90 TABLET | Refills: 0 | Status: SHIPPED | OUTPATIENT
Start: 2024-07-20 | End: 2024-08-19

## 2024-08-14 DIAGNOSIS — S16.1XXD STRAIN OF NECK MUSCLE, SUBSEQUENT ENCOUNTER: ICD-10-CM

## 2024-08-14 RX ORDER — OXYCODONE HYDROCHLORIDE 30 MG/1
30 TABLET ORAL EVERY 8 HOURS PRN
Qty: 90 TABLET | Refills: 0 | Status: SHIPPED | OUTPATIENT
Start: 2024-08-14 | End: 2024-09-13

## 2024-09-16 ENCOUNTER — TELEPHONE (OUTPATIENT)
Dept: NEUROLOGY | Age: 69
End: 2024-09-16

## 2024-09-16 DIAGNOSIS — G95.9 CERVICAL MYELOPATHY (HCC): Primary | ICD-10-CM

## 2024-09-16 RX ORDER — OXYCODONE HYDROCHLORIDE 30 MG/1
30 TABLET ORAL EVERY 8 HOURS PRN
Qty: 90 TABLET | Refills: 0 | Status: SHIPPED | OUTPATIENT
Start: 2024-09-16 | End: 2024-10-16

## 2024-10-16 ENCOUNTER — TELEPHONE (OUTPATIENT)
Dept: NEUROLOGY | Age: 69
End: 2024-10-16

## 2024-10-17 DIAGNOSIS — G95.9 CERVICAL MYELOPATHY (HCC): Primary | ICD-10-CM

## 2024-10-17 RX ORDER — OXYCODONE HYDROCHLORIDE 30 MG/1
30 TABLET ORAL EVERY 8 HOURS PRN
Qty: 90 TABLET | Refills: 0 | Status: SHIPPED | OUTPATIENT
Start: 2024-10-17 | End: 2024-11-16

## 2024-11-15 ENCOUNTER — TELEPHONE (OUTPATIENT)
Dept: NEUROLOGY | Age: 69
End: 2024-11-15

## 2024-11-15 DIAGNOSIS — G95.9 CERVICAL MYELOPATHY (HCC): ICD-10-CM

## 2024-11-15 RX ORDER — OXYCODONE HYDROCHLORIDE 30 MG/1
30 TABLET ORAL EVERY 8 HOURS PRN
Qty: 90 TABLET | Refills: 0 | Status: SHIPPED | OUTPATIENT
Start: 2024-11-15 | End: 2024-12-15

## 2024-12-16 ENCOUNTER — TELEPHONE (OUTPATIENT)
Dept: NEUROLOGY | Age: 69
End: 2024-12-16

## 2024-12-16 DIAGNOSIS — G95.9 CERVICAL MYELOPATHY (HCC): Primary | ICD-10-CM

## 2024-12-16 RX ORDER — OXYCODONE HYDROCHLORIDE 10 MG/1
10 TABLET ORAL EVERY 8 HOURS PRN
Qty: 90 TABLET | Refills: 0 | Status: SHIPPED | OUTPATIENT
Start: 2024-12-16 | End: 2025-01-15

## 2024-12-18 ENCOUNTER — TELEPHONE (OUTPATIENT)
Dept: NEUROLOGY | Age: 69
End: 2024-12-18

## 2024-12-18 DIAGNOSIS — G95.9 CERVICAL MYELOPATHY (HCC): Primary | ICD-10-CM

## 2024-12-18 RX ORDER — OXYCODONE HYDROCHLORIDE 30 MG/1
30 TABLET ORAL EVERY 8 HOURS PRN
Qty: 90 TABLET | Refills: 0 | Status: SHIPPED | OUTPATIENT
Start: 2024-12-18 | End: 2025-01-17

## 2024-12-18 NOTE — TELEPHONE ENCOUNTER
Patient requested refill oxycodone that was sent 12/16. 10 mg was sent in. He usually gets 30 mg and is wondering if this is a change? Please clarify or send 30mg to [place]

## 2024-12-24 RX ORDER — BACLOFEN 10 MG/1
TABLET ORAL
Qty: 180 TABLET | Refills: 5 | OUTPATIENT
Start: 2024-12-24

## 2024-12-24 NOTE — TELEPHONE ENCOUNTER
Pharmacy note says: \" This prescription was filled on 12/3/2024\". This refill can wait until Dr. HARDIN is back.

## 2024-12-26 RX ORDER — BACLOFEN 10 MG/1
TABLET ORAL
Qty: 180 TABLET | Refills: 5 | Status: SHIPPED | OUTPATIENT
Start: 2024-12-26

## 2025-01-20 ENCOUNTER — TELEPHONE (OUTPATIENT)
Dept: NEUROLOGY | Age: 70
End: 2025-01-20

## 2025-01-20 DIAGNOSIS — S16.1XXD STRAIN OF NECK MUSCLE, SUBSEQUENT ENCOUNTER: Primary | ICD-10-CM

## 2025-01-20 RX ORDER — OXYCODONE HYDROCHLORIDE 30 MG/1
30 TABLET ORAL EVERY 8 HOURS PRN
Qty: 90 TABLET | Refills: 0 | Status: SHIPPED | OUTPATIENT
Start: 2025-01-20 | End: 2025-02-19

## 2025-01-20 RX ORDER — OXYCODONE HYDROCHLORIDE 15 MG/1
30 TABLET ORAL EVERY 8 HOURS PRN
Qty: 180 TABLET | Refills: 0 | Status: SHIPPED | OUTPATIENT
Start: 2025-01-20 | End: 2025-02-19

## 2025-01-20 NOTE — TELEPHONE ENCOUNTER
Pharmacy called- Oxycodone 30mg is on back order. They have 15mg and asked us to send a new script Oxycodone 15mg immediate release tablet. Take 2 tablets by mouth every 8 hours as needed for pain for up to 30 days. #180

## 2025-02-11 ENCOUNTER — TELEMEDICINE (OUTPATIENT)
Dept: NEUROLOGY | Age: 70
End: 2025-02-11
Payer: COMMERCIAL

## 2025-02-11 DIAGNOSIS — G95.9 CERVICAL MYELOPATHY (HCC): Primary | ICD-10-CM

## 2025-02-11 DIAGNOSIS — S16.1XXD STRAIN OF NECK MUSCLE, SUBSEQUENT ENCOUNTER: ICD-10-CM

## 2025-02-11 DIAGNOSIS — G24.3 CERVICAL DYSTONIA: ICD-10-CM

## 2025-02-11 PROCEDURE — 4004F PT TOBACCO SCREEN RCVD TLK: CPT | Performed by: PSYCHIATRY & NEUROLOGY

## 2025-02-11 PROCEDURE — 99213 OFFICE O/P EST LOW 20 MIN: CPT | Performed by: PSYCHIATRY & NEUROLOGY

## 2025-02-11 PROCEDURE — G8421 BMI NOT CALCULATED: HCPCS | Performed by: PSYCHIATRY & NEUROLOGY

## 2025-02-11 PROCEDURE — G8427 DOCREV CUR MEDS BY ELIG CLIN: HCPCS | Performed by: PSYCHIATRY & NEUROLOGY

## 2025-02-11 PROCEDURE — 1123F ACP DISCUSS/DSCN MKR DOCD: CPT | Performed by: PSYCHIATRY & NEUROLOGY

## 2025-02-11 PROCEDURE — 3017F COLORECTAL CA SCREEN DOC REV: CPT | Performed by: PSYCHIATRY & NEUROLOGY

## 2025-02-11 RX ORDER — OXYCODONE HYDROCHLORIDE 30 MG/1
30 TABLET ORAL EVERY 8 HOURS PRN
Qty: 90 TABLET | Refills: 0 | Status: SHIPPED | OUTPATIENT
Start: 2025-04-17 | End: 2025-05-17

## 2025-02-11 RX ORDER — OXYCODONE HYDROCHLORIDE 30 MG/1
30 TABLET ORAL EVERY 8 HOURS PRN
Qty: 90 TABLET | Refills: 0 | Status: SHIPPED | OUTPATIENT
Start: 2025-02-16 | End: 2025-03-18

## 2025-02-11 RX ORDER — OXYCODONE HYDROCHLORIDE 30 MG/1
30 TABLET ORAL EVERY 8 HOURS PRN
Qty: 90 TABLET | Refills: 0 | Status: SHIPPED | OUTPATIENT
Start: 2025-03-18 | End: 2025-04-17

## 2025-02-11 NOTE — PROGRESS NOTES
result of his cervical surgery also ended up having cervical dystonic position of his neck.  Botox was tried with no success in fact worse to make things miserably worse that was 20 years ago.  We discontinued his Klonopin he has tried epidural steroid injections physical therapy chronic pain management trigger point injections.  And he has no intentions of doing anything invasive such as spinal cord stimulators.    IMAGING REVIEW:  I REVIEWED PERTINENT  IMAGES AND REPORTS WITH THE PATIENT PERSONALLY, DIRECTLY AND FULLY.     Past Medical History:  No past medical history on file.    Past Surgical History:  No past surgical history on file.    Social History:  Social History     Socioeconomic History    Marital status:      Spouse name: Not on file    Number of children: Not on file    Years of education: Not on file    Highest education level: Not on file   Occupational History    Not on file   Tobacco Use    Smoking status: Former    Smokeless tobacco: Never   Substance and Sexual Activity    Alcohol use: Never    Drug use: Never    Sexual activity: Not on file   Other Topics Concern    Not on file   Social History Narrative    Not on file     Social Determinants of Health     Financial Resource Strain: Not on file   Food Insecurity: Not on file   Transportation Needs: Not on file   Physical Activity: Not on file   Stress: Not on file   Social Connections: Not on file   Intimate Partner Violence: Not on file   Housing Stability: Not on file       Family History:   No family history on file.    Current Outpatient Medications on File Prior to Visit   Medication Sig Dispense Refill    oxyCODONE (OXY-IR) 15 MG immediate release tablet Take 2 tablets by mouth every 8 hours as needed for Pain for up to 30 days. Max Daily Amount: 90 mg 180 tablet 0    baclofen (LIORESAL) 10 MG tablet TaKE 1-2 TABLETS BY MOUTH THREE TIMES DAILY] 180 tablet 5    gabapentin (NEURONTIN) 600 MG tablet Take 1 tablet by mouth 2 times

## 2025-05-13 ENCOUNTER — TELEPHONE (OUTPATIENT)
Dept: NEUROLOGY | Age: 70
End: 2025-05-13

## 2025-05-13 DIAGNOSIS — S16.1XXD STRAIN OF NECK MUSCLE, SUBSEQUENT ENCOUNTER: ICD-10-CM

## 2025-05-13 RX ORDER — OXYCODONE HYDROCHLORIDE 30 MG/1
30 TABLET ORAL EVERY 8 HOURS PRN
Qty: 90 TABLET | Refills: 0 | Status: SHIPPED | OUTPATIENT
Start: 2025-05-13 | End: 2025-06-12

## 2025-06-12 ENCOUNTER — TELEPHONE (OUTPATIENT)
Dept: NEUROLOGY | Age: 70
End: 2025-06-12

## 2025-06-12 DIAGNOSIS — S16.1XXD STRAIN OF NECK MUSCLE, SUBSEQUENT ENCOUNTER: ICD-10-CM

## 2025-06-12 RX ORDER — OXYCODONE HYDROCHLORIDE 30 MG/1
30 TABLET ORAL EVERY 8 HOURS PRN
Qty: 90 TABLET | Refills: 0 | Status: SHIPPED | OUTPATIENT
Start: 2025-06-12 | End: 2025-07-12

## 2025-07-14 DIAGNOSIS — G95.9 CERVICAL MYELOPATHY (HCC): Primary | ICD-10-CM

## 2025-07-14 RX ORDER — OXYCODONE HYDROCHLORIDE 30 MG/1
30 TABLET ORAL EVERY 8 HOURS PRN
Qty: 90 TABLET | Refills: 0 | Status: SHIPPED | OUTPATIENT
Start: 2025-07-14 | End: 2025-08-13

## 2025-07-14 RX ORDER — BACLOFEN 10 MG/1
TABLET ORAL
Qty: 180 TABLET | Refills: 5 | Status: SHIPPED | OUTPATIENT
Start: 2025-07-14

## 2025-07-28 RX ORDER — GABAPENTIN 600 MG/1
600 TABLET ORAL 2 TIMES DAILY
Qty: 540 TABLET | Refills: 3 | Status: SHIPPED | OUTPATIENT
Start: 2025-07-28 | End: 2025-10-28

## 2025-08-15 ENCOUNTER — TELEMEDICINE (OUTPATIENT)
Dept: NEUROLOGY | Age: 70
End: 2025-08-15
Payer: COMMERCIAL

## 2025-08-15 DIAGNOSIS — G95.9 CERVICAL MYELOPATHY (HCC): ICD-10-CM

## 2025-08-15 DIAGNOSIS — G24.3 CERVICAL DYSTONIA: Primary | ICD-10-CM

## 2025-08-15 PROCEDURE — 99214 OFFICE O/P EST MOD 30 MIN: CPT | Performed by: PSYCHIATRY & NEUROLOGY

## 2025-08-15 RX ORDER — OXYCODONE HYDROCHLORIDE 30 MG/1
30 TABLET ORAL EVERY 8 HOURS PRN
Qty: 90 TABLET | Refills: 0 | Status: SHIPPED | OUTPATIENT
Start: 2025-08-15 | End: 2025-09-14

## 2025-08-15 ASSESSMENT — ENCOUNTER SYMPTOMS
GASTROINTESTINAL NEGATIVE: 1
RESPIRATORY NEGATIVE: 1
EYES NEGATIVE: 1
ALLERGIC/IMMUNOLOGIC NEGATIVE: 1